# Patient Record
Sex: MALE | Race: WHITE | Employment: FULL TIME | URBAN - METROPOLITAN AREA
[De-identification: names, ages, dates, MRNs, and addresses within clinical notes are randomized per-mention and may not be internally consistent; named-entity substitution may affect disease eponyms.]

---

## 2023-11-06 ENCOUNTER — HOSPITAL ENCOUNTER (OUTPATIENT)
Age: 73
Setting detail: SPECIMEN
Discharge: HOME | End: 2023-11-06

## 2023-11-06 DIAGNOSIS — N28.9 RENAL INSUFFICIENCY: ICD-10-CM

## 2023-11-06 LAB
BUN SERPL-MCNC: 23 MG/DL (ref 8–23)
CREAT SERPL-MCNC: 1.2 MG/DL (ref 0.7–1.2)
GFR SERPL CREATININE-BSD FRML MDRD: >60 ML/MIN/1.73M2

## 2024-02-12 ENCOUNTER — HOSPITAL ENCOUNTER (OUTPATIENT)
Age: 74
Setting detail: SPECIMEN
Discharge: HOME | End: 2024-02-12

## 2024-02-12 DIAGNOSIS — R35.1 NOCTURIA: ICD-10-CM

## 2024-02-12 DIAGNOSIS — E29.1 MALE HYPOGONADISM: ICD-10-CM

## 2024-02-12 DIAGNOSIS — I10 ESSENTIAL HYPERTENSION: ICD-10-CM

## 2024-02-12 LAB
ALT SERPL-CCNC: 25 U/L (ref 5–41)
ANION GAP SERPL CALCULATED.3IONS-SCNC: 11 MMOL/L (ref 9–17)
AST SERPL-CCNC: 28 U/L
BUN SERPL-MCNC: 17 MG/DL (ref 8–23)
CALCIUM SERPL-MCNC: 9.1 MG/DL (ref 8.6–10.4)
CHLORIDE SERPL-SCNC: 96 MMOL/L (ref 98–107)
CO2 SERPL-SCNC: 27 MMOL/L (ref 20–31)
CREAT SERPL-MCNC: 1.1 MG/DL (ref 0.7–1.2)
GFR SERPL CREATININE-BSD FRML MDRD: >60 ML/MIN/1.73M2
GLUCOSE P FAST SERPL-MCNC: 94 MG/DL (ref 70–99)
POTASSIUM SERPL-SCNC: 3.5 MMOL/L (ref 3.7–5.3)
PSA SERPL-MCNC: 2 NG/ML (ref 0–4)
SHBG SERPL-SCNC: 47 NMOL/L (ref 11–80)
SODIUM SERPL-SCNC: 134 MMOL/L (ref 135–144)
TESTOST FREE MFR SERPL: 86 PG/ML (ref 47–244)
TESTOST SERPL-MCNC: 513 NG/DL (ref 220–1000)

## 2024-05-22 ENCOUNTER — HOSPITAL ENCOUNTER (OUTPATIENT)
Age: 74
Setting detail: SPECIMEN
Discharge: HOME | End: 2024-05-22

## 2024-05-22 DIAGNOSIS — R35.1 NOCTURIA: ICD-10-CM

## 2024-05-22 DIAGNOSIS — E29.1 HYPOGONADISM MALE: ICD-10-CM

## 2024-05-22 LAB
ALT SERPL-CCNC: 30 U/L (ref 10–50)
AST SERPL-CCNC: 50 U/L (ref 10–50)
BASOPHILS # BLD: <0.03 K/UL (ref 0–0.2)
BASOPHILS NFR BLD: 0 % (ref 0–2)
EOSINOPHIL # BLD: <0.03 K/UL (ref 0–0.44)
EOSINOPHILS RELATIVE PERCENT: 0 % (ref 1–4)
ERYTHROCYTE [DISTWIDTH] IN BLOOD BY AUTOMATED COUNT: 12.4 % (ref 11.8–14.4)
ESTRADIOL LEVEL: 61 PG/ML (ref 27–52)
HCT VFR BLD AUTO: 47.9 % (ref 40.7–50.3)
HGB BLD-MCNC: 16.5 G/DL (ref 13–17)
IMM GRANULOCYTES # BLD AUTO: 0.04 K/UL (ref 0–0.3)
IMM GRANULOCYTES NFR BLD: 1 %
LYMPHOCYTES NFR BLD: 0.82 K/UL (ref 1.1–3.7)
LYMPHOCYTES RELATIVE PERCENT: 14 % (ref 24–43)
MCH RBC QN AUTO: 32.1 PG (ref 25.2–33.5)
MCHC RBC AUTO-ENTMCNC: 34.4 G/DL (ref 28.4–34.8)
MCV RBC AUTO: 93.2 FL (ref 82.6–102.9)
MONOCYTES NFR BLD: 0.53 K/UL (ref 0.1–1.2)
MONOCYTES NFR BLD: 9 % (ref 3–12)
NEUTROPHILS NFR BLD: 76 % (ref 36–65)
NEUTS SEG NFR BLD: 4.32 K/UL (ref 1.5–8.1)
NRBC BLD-RTO: 0 PER 100 WBC
PLATELET # BLD AUTO: 200 K/UL (ref 138–453)
PMV BLD AUTO: 9.7 FL (ref 8.1–13.5)
PSA SERPL-MCNC: 2 NG/ML (ref 0–4)
RBC # BLD AUTO: 5.14 M/UL (ref 4.21–5.77)
SHBG SERPL-SCNC: 40 NMOL/L (ref 19–76)
TESTOST FREE MFR SERPL: 20.9 PG/ML (ref 47–244)
TESTOST SERPL-MCNC: 127 NG/DL (ref 193–740)
WBC OTHER # BLD: 5.7 K/UL (ref 3.5–11.3)

## 2024-09-13 ENCOUNTER — HOSPITAL ENCOUNTER (OUTPATIENT)
Age: 74
Setting detail: SPECIMEN
Discharge: HOME | End: 2024-09-13

## 2024-09-13 DIAGNOSIS — E29.1 HYPOGONADISM MALE: ICD-10-CM

## 2024-09-13 DIAGNOSIS — R35.1 NOCTURIA: ICD-10-CM

## 2024-09-13 LAB
ALT SERPL-CCNC: 22 U/L (ref 10–50)
AST SERPL-CCNC: 26 U/L (ref 10–50)
BASOPHILS # BLD: 0.03 K/UL (ref 0–0.2)
BASOPHILS NFR BLD: 0 % (ref 0–2)
EOSINOPHIL # BLD: 0.04 K/UL (ref 0–0.44)
EOSINOPHILS RELATIVE PERCENT: 1 % (ref 1–4)
ERYTHROCYTE [DISTWIDTH] IN BLOOD BY AUTOMATED COUNT: 12.5 % (ref 11.8–14.4)
ESTRADIOL LEVEL: 44 PG/ML (ref 27–52)
HCT VFR BLD AUTO: 45.5 % (ref 40.7–50.3)
HGB BLD-MCNC: 16.1 G/DL (ref 13–17)
IMM GRANULOCYTES # BLD AUTO: 0.07 K/UL (ref 0–0.3)
IMM GRANULOCYTES NFR BLD: 1 %
LYMPHOCYTES NFR BLD: 1.62 K/UL (ref 1.1–3.7)
LYMPHOCYTES RELATIVE PERCENT: 22 % (ref 24–43)
MCH RBC QN AUTO: 32.7 PG (ref 25.2–33.5)
MCHC RBC AUTO-ENTMCNC: 35.4 G/DL (ref 28.4–34.8)
MCV RBC AUTO: 92.5 FL (ref 82.6–102.9)
MONOCYTES NFR BLD: 0.66 K/UL (ref 0.1–1.2)
MONOCYTES NFR BLD: 9 % (ref 3–12)
NEUTROPHILS NFR BLD: 67 % (ref 36–65)
NEUTS SEG NFR BLD: 4.93 K/UL (ref 1.5–8.1)
NRBC BLD-RTO: 0 PER 100 WBC
PLATELET # BLD AUTO: 275 K/UL (ref 138–453)
PMV BLD AUTO: 8.9 FL (ref 8.1–13.5)
PSA SERPL-MCNC: 1.8 NG/ML (ref 0–4)
RBC # BLD AUTO: 4.92 M/UL (ref 4.21–5.77)
SHBG SERPL-SCNC: 31 NMOL/L (ref 19–76)
TESTOST FREE MFR SERPL: 203.3 PG/ML (ref 47–244)
TESTOST SERPL-MCNC: 846 NG/DL (ref 193–740)
WBC OTHER # BLD: 7.4 K/UL (ref 3.5–11.3)

## 2025-01-07 ENCOUNTER — HOSPITAL ENCOUNTER (OUTPATIENT)
Age: 75
Setting detail: SPECIMEN
Discharge: HOME | End: 2025-01-07

## 2025-01-07 DIAGNOSIS — R35.1 NOCTURIA: ICD-10-CM

## 2025-01-07 DIAGNOSIS — E29.1 HYPOGONADISM MALE: ICD-10-CM

## 2025-01-07 LAB
ALT SERPL-CCNC: 26 U/L (ref 10–50)
AST SERPL-CCNC: 26 U/L (ref 10–50)
BASOPHILS # BLD: 0.03 K/UL (ref 0–0.2)
BASOPHILS NFR BLD: 0 % (ref 0–2)
EOSINOPHIL # BLD: 0.1 K/UL (ref 0–0.44)
EOSINOPHILS RELATIVE PERCENT: 1 % (ref 1–4)
ERYTHROCYTE [DISTWIDTH] IN BLOOD BY AUTOMATED COUNT: 12.4 % (ref 11.8–14.4)
ESTRADIOL LEVEL: 21.2 PG/ML (ref 27–52)
HCT VFR BLD AUTO: 49.3 % (ref 40.7–50.3)
HGB BLD-MCNC: 16.7 G/DL (ref 13–17)
IMM GRANULOCYTES # BLD AUTO: 0.05 K/UL (ref 0–0.3)
IMM GRANULOCYTES NFR BLD: 1 %
LYMPHOCYTES NFR BLD: 1.7 K/UL (ref 1.1–3.7)
LYMPHOCYTES RELATIVE PERCENT: 22 % (ref 24–43)
MCH RBC QN AUTO: 32.4 PG (ref 25.2–33.5)
MCHC RBC AUTO-ENTMCNC: 33.9 G/DL (ref 28.4–34.8)
MCV RBC AUTO: 95.7 FL (ref 82.6–102.9)
MONOCYTES NFR BLD: 0.65 K/UL (ref 0.1–1.2)
MONOCYTES NFR BLD: 8 % (ref 3–12)
NEUTROPHILS NFR BLD: 68 % (ref 36–65)
NEUTS SEG NFR BLD: 5.3 K/UL (ref 1.5–8.1)
NRBC BLD-RTO: 0 PER 100 WBC
PLATELET # BLD AUTO: 212 K/UL (ref 138–453)
PMV BLD AUTO: 9.5 FL (ref 8.1–13.5)
PSA SERPL-MCNC: 2.78 NG/ML (ref 0–4)
RBC # BLD AUTO: 5.15 M/UL (ref 4.21–5.77)
SHBG SERPL-SCNC: 34 NMOL/L (ref 19–76)
TESTOST FREE MFR SERPL: 71.4 PG/ML (ref 47–244)
TESTOST SERPL-MCNC: 362 NG/DL (ref 193–740)
WBC OTHER # BLD: 7.8 K/UL (ref 3.5–11.3)

## 2025-05-20 ENCOUNTER — HOSPITAL ENCOUNTER (OUTPATIENT)
Age: 75
Setting detail: OBSERVATION
Discharge: HOME | End: 2025-05-21
Attending: EMERGENCY MEDICINE | Admitting: FAMILY MEDICINE
Payer: MEDICARE

## 2025-05-20 ENCOUNTER — APPOINTMENT (OUTPATIENT)
Dept: GENERAL RADIOLOGY | Age: 75
End: 2025-05-20
Payer: MEDICARE

## 2025-05-20 DIAGNOSIS — M10.9 ACUTE GOUT, UNSPECIFIED CAUSE, UNSPECIFIED SITE: ICD-10-CM

## 2025-05-20 DIAGNOSIS — L03.012 CELLULITIS OF FINGER OF LEFT HAND: Primary | ICD-10-CM

## 2025-05-20 LAB
ALBUMIN SERPL-MCNC: 4.1 G/DL (ref 3.5–5.2)
ALBUMIN/GLOB SERPL: 1.4 {RATIO} (ref 1–2.5)
ALP SERPL-CCNC: 67 U/L (ref 40–129)
ALT SERPL-CCNC: 23 U/L (ref 10–50)
ANION GAP SERPL CALCULATED.3IONS-SCNC: 17 MMOL/L (ref 9–16)
AST SERPL-CCNC: 25 U/L (ref 10–50)
BASOPHILS # BLD: 0.04 K/UL (ref 0–0.2)
BASOPHILS NFR BLD: 1 % (ref 0–2)
BILIRUB SERPL-MCNC: 0.4 MG/DL (ref 0–1.2)
BUN SERPL-MCNC: 22 MG/DL (ref 8–23)
CALCIUM SERPL-MCNC: 9.1 MG/DL (ref 8.8–10.2)
CHLORIDE SERPL-SCNC: 101 MMOL/L (ref 98–107)
CO2 SERPL-SCNC: 20 MMOL/L (ref 20–31)
CREAT SERPL-MCNC: 1.1 MG/DL (ref 0.7–1.2)
EOSINOPHIL # BLD: 0.22 K/UL (ref 0–0.44)
EOSINOPHILS RELATIVE PERCENT: 3 % (ref 1–4)
ERYTHROCYTE [DISTWIDTH] IN BLOOD BY AUTOMATED COUNT: 12.4 % (ref 11.8–14.4)
GFR, ESTIMATED: 74 ML/MIN/1.73M2
GLUCOSE SERPL-MCNC: 102 MG/DL (ref 82–115)
HCT VFR BLD AUTO: 44.8 % (ref 40.7–50.3)
HGB BLD-MCNC: 16.6 G/DL (ref 13–17)
IMM GRANULOCYTES # BLD AUTO: 0.04 K/UL (ref 0–0.3)
IMM GRANULOCYTES NFR BLD: 1 %
LYMPHOCYTES NFR BLD: 1.73 K/UL (ref 1.1–3.7)
LYMPHOCYTES RELATIVE PERCENT: 20 % (ref 24–43)
MCH RBC QN AUTO: 33.9 PG (ref 25.2–33.5)
MCHC RBC AUTO-ENTMCNC: 37.1 G/DL (ref 28.4–34.8)
MCV RBC AUTO: 91.6 FL (ref 82.6–102.9)
MONOCYTES NFR BLD: 0.76 K/UL (ref 0.1–1.2)
MONOCYTES NFR BLD: 9 % (ref 3–12)
NEUTROPHILS NFR BLD: 66 % (ref 36–65)
NEUTS SEG NFR BLD: 5.84 K/UL (ref 1.5–8.1)
NRBC BLD-RTO: 0 PER 100 WBC
PLATELET # BLD AUTO: 211 K/UL (ref 138–453)
PMV BLD AUTO: 8.8 FL (ref 8.1–13.5)
POTASSIUM SERPL-SCNC: 3.3 MMOL/L (ref 3.7–5.3)
PROT SERPL-MCNC: 7 G/DL (ref 6.6–8.7)
RBC # BLD AUTO: 4.89 M/UL (ref 4.21–5.77)
SODIUM SERPL-SCNC: 139 MMOL/L (ref 136–145)
WBC OTHER # BLD: 8.6 K/UL (ref 3.5–11.3)

## 2025-05-20 PROCEDURE — 80053 COMPREHEN METABOLIC PANEL: CPT

## 2025-05-20 PROCEDURE — 85652 RBC SED RATE AUTOMATED: CPT

## 2025-05-20 PROCEDURE — 99285 EMERGENCY DEPT VISIT HI MDM: CPT

## 2025-05-20 PROCEDURE — 96375 TX/PRO/DX INJ NEW DRUG ADDON: CPT

## 2025-05-20 PROCEDURE — 6370000000 HC RX 637 (ALT 250 FOR IP)

## 2025-05-20 PROCEDURE — 96374 THER/PROPH/DIAG INJ IV PUSH: CPT

## 2025-05-20 PROCEDURE — 85025 COMPLETE CBC W/AUTO DIFF WBC: CPT

## 2025-05-20 PROCEDURE — 73130 X-RAY EXAM OF HAND: CPT

## 2025-05-20 PROCEDURE — 2500000003 HC RX 250 WO HCPCS

## 2025-05-20 PROCEDURE — 6360000002 HC RX W HCPCS

## 2025-05-20 PROCEDURE — 84550 ASSAY OF BLOOD/URIC ACID: CPT

## 2025-05-20 PROCEDURE — 86140 C-REACTIVE PROTEIN: CPT

## 2025-05-20 PROCEDURE — 87040 BLOOD CULTURE FOR BACTERIA: CPT

## 2025-05-20 RX ORDER — KETOROLAC TROMETHAMINE 15 MG/ML
15 INJECTION, SOLUTION INTRAMUSCULAR; INTRAVENOUS ONCE
Status: COMPLETED | OUTPATIENT
Start: 2025-05-20 | End: 2025-05-20

## 2025-05-20 RX ADMIN — CEFAZOLIN 2000 MG: 10 INJECTION, POWDER, FOR SOLUTION INTRAVENOUS at 23:39

## 2025-05-20 RX ADMIN — POTASSIUM BICARBONATE 40 MEQ: 782 TABLET, EFFERVESCENT ORAL at 23:10

## 2025-05-20 RX ADMIN — KETOROLAC TROMETHAMINE 15 MG: 15 INJECTION, SOLUTION INTRAMUSCULAR; INTRAVENOUS at 22:36

## 2025-05-20 ASSESSMENT — ENCOUNTER SYMPTOMS
COUGH: 0
WHEEZING: 0
STRIDOR: 0
NAUSEA: 0
DIARRHEA: 0
SHORTNESS OF BREATH: 0
VOMITING: 0
CONSTIPATION: 0
ABDOMINAL PAIN: 0

## 2025-05-20 ASSESSMENT — PAIN DESCRIPTION - DESCRIPTORS: DESCRIPTORS: THROBBING

## 2025-05-20 ASSESSMENT — LIFESTYLE VARIABLES
HOW OFTEN DO YOU HAVE A DRINK CONTAINING ALCOHOL: MONTHLY OR LESS
HOW MANY STANDARD DRINKS CONTAINING ALCOHOL DO YOU HAVE ON A TYPICAL DAY: 1 OR 2

## 2025-05-20 ASSESSMENT — PAIN SCALES - GENERAL: PAINLEVEL_OUTOF10: 3

## 2025-05-20 ASSESSMENT — PAIN DESCRIPTION - LOCATION: LOCATION: HAND

## 2025-05-20 ASSESSMENT — PAIN DESCRIPTION - ORIENTATION: ORIENTATION: LEFT

## 2025-05-21 VITALS
BODY MASS INDEX: 27.89 KG/M2 | HEART RATE: 75 BPM | SYSTOLIC BLOOD PRESSURE: 162 MMHG | RESPIRATION RATE: 17 BRPM | DIASTOLIC BLOOD PRESSURE: 97 MMHG | OXYGEN SATURATION: 95 % | TEMPERATURE: 97.5 F | HEIGHT: 68 IN | WEIGHT: 184 LBS

## 2025-05-21 PROBLEM — Z78.9 DAILY CONSUMPTION OF ALCOHOL: Status: ACTIVE | Noted: 2025-05-21

## 2025-05-21 PROBLEM — E87.6 HYPOKALEMIA: Status: ACTIVE | Noted: 2025-05-21

## 2025-05-21 PROBLEM — M10.9 ACUTE GOUT: Status: ACTIVE | Noted: 2025-05-21

## 2025-05-21 PROBLEM — L03.114 CELLULITIS OF LEFT HAND: Status: ACTIVE | Noted: 2025-05-21

## 2025-05-21 PROBLEM — L03.012 CELLULITIS OF FINGER OF LEFT HAND: Status: ACTIVE | Noted: 2025-05-21

## 2025-05-21 LAB
CRP SERPL HS-MCNC: 13.2 MG/L (ref 0–5)
ERYTHROCYTE [SEDIMENTATION RATE] IN BLOOD BY PHOTOMETRIC METHOD: 19 MM/HR (ref 0–20)
URATE SERPL-MCNC: 7 MG/DL (ref 3.4–7)

## 2025-05-21 PROCEDURE — 6370000000 HC RX 637 (ALT 250 FOR IP)

## 2025-05-21 PROCEDURE — 99222 1ST HOSP IP/OBS MODERATE 55: CPT | Performed by: FAMILY MEDICINE

## 2025-05-21 PROCEDURE — 6360000002 HC RX W HCPCS

## 2025-05-21 PROCEDURE — G0378 HOSPITAL OBSERVATION PER HR: HCPCS

## 2025-05-21 PROCEDURE — APPSS45 APP SPLIT SHARED TIME 31-45 MINUTES

## 2025-05-21 PROCEDURE — 96366 THER/PROPH/DIAG IV INF ADDON: CPT

## 2025-05-21 PROCEDURE — 2580000003 HC RX 258

## 2025-05-21 PROCEDURE — 96365 THER/PROPH/DIAG IV INF INIT: CPT

## 2025-05-21 RX ORDER — FOLIC ACID 1 MG/1
1 TABLET ORAL DAILY
Status: DISCONTINUED | OUTPATIENT
Start: 2025-05-21 | End: 2025-05-21 | Stop reason: HOSPADM

## 2025-05-21 RX ORDER — GAUZE BANDAGE 2" X 2"
100 BANDAGE TOPICAL DAILY
Status: DISCONTINUED | OUTPATIENT
Start: 2025-05-21 | End: 2025-05-21 | Stop reason: HOSPADM

## 2025-05-21 RX ORDER — ONDANSETRON 4 MG/1
4 TABLET, ORALLY DISINTEGRATING ORAL EVERY 8 HOURS PRN
Status: DISCONTINUED | OUTPATIENT
Start: 2025-05-21 | End: 2025-05-21 | Stop reason: HOSPADM

## 2025-05-21 RX ORDER — ENOXAPARIN SODIUM 100 MG/ML
40 INJECTION SUBCUTANEOUS DAILY
Status: DISCONTINUED | OUTPATIENT
Start: 2025-05-21 | End: 2025-05-21 | Stop reason: HOSPADM

## 2025-05-21 RX ORDER — AMLODIPINE BESYLATE 5 MG/1
5 TABLET ORAL DAILY
Qty: 90 TABLET | Refills: 0 | Status: SHIPPED | OUTPATIENT
Start: 2025-05-21

## 2025-05-21 RX ORDER — POTASSIUM CHLORIDE 7.45 MG/ML
10 INJECTION INTRAVENOUS PRN
Status: DISCONTINUED | OUTPATIENT
Start: 2025-05-21 | End: 2025-05-21 | Stop reason: HOSPADM

## 2025-05-21 RX ORDER — POTASSIUM CHLORIDE 1500 MG/1
40 TABLET, EXTENDED RELEASE ORAL PRN
Status: DISCONTINUED | OUTPATIENT
Start: 2025-05-21 | End: 2025-05-21 | Stop reason: HOSPADM

## 2025-05-21 RX ORDER — ACETAMINOPHEN 650 MG/1
650 SUPPOSITORY RECTAL EVERY 6 HOURS PRN
Status: DISCONTINUED | OUTPATIENT
Start: 2025-05-21 | End: 2025-05-21 | Stop reason: HOSPADM

## 2025-05-21 RX ORDER — SODIUM CHLORIDE 0.9 % (FLUSH) 0.9 %
10 SYRINGE (ML) INJECTION PRN
Status: DISCONTINUED | OUTPATIENT
Start: 2025-05-21 | End: 2025-05-21 | Stop reason: HOSPADM

## 2025-05-21 RX ORDER — POLYETHYLENE GLYCOL 3350 17 G/17G
17 POWDER, FOR SOLUTION ORAL DAILY PRN
Status: DISCONTINUED | OUTPATIENT
Start: 2025-05-21 | End: 2025-05-21 | Stop reason: HOSPADM

## 2025-05-21 RX ORDER — ONDANSETRON 2 MG/ML
4 INJECTION INTRAMUSCULAR; INTRAVENOUS EVERY 6 HOURS PRN
Status: DISCONTINUED | OUTPATIENT
Start: 2025-05-21 | End: 2025-05-21 | Stop reason: HOSPADM

## 2025-05-21 RX ORDER — MAGNESIUM SULFATE 1 G/100ML
1000 INJECTION INTRAVENOUS PRN
Status: DISCONTINUED | OUTPATIENT
Start: 2025-05-21 | End: 2025-05-21 | Stop reason: HOSPADM

## 2025-05-21 RX ORDER — SODIUM CHLORIDE 0.9 % (FLUSH) 0.9 %
5-40 SYRINGE (ML) INJECTION EVERY 12 HOURS SCHEDULED
Status: DISCONTINUED | OUTPATIENT
Start: 2025-05-21 | End: 2025-05-21 | Stop reason: HOSPADM

## 2025-05-21 RX ORDER — SODIUM CHLORIDE 9 MG/ML
INJECTION, SOLUTION INTRAVENOUS PRN
Status: DISCONTINUED | OUTPATIENT
Start: 2025-05-21 | End: 2025-05-21 | Stop reason: HOSPADM

## 2025-05-21 RX ORDER — DOXYCYCLINE HYCLATE 100 MG
100 TABLET ORAL 2 TIMES DAILY
Qty: 14 TABLET | Refills: 0 | Status: SHIPPED | OUTPATIENT
Start: 2025-05-21 | End: 2025-05-28

## 2025-05-21 RX ORDER — ACETAMINOPHEN 325 MG/1
650 TABLET ORAL EVERY 6 HOURS PRN
Status: DISCONTINUED | OUTPATIENT
Start: 2025-05-21 | End: 2025-05-21 | Stop reason: HOSPADM

## 2025-05-21 RX ORDER — SODIUM CHLORIDE 9 MG/ML
INJECTION, SOLUTION INTRAVENOUS CONTINUOUS
Status: DISCONTINUED | OUTPATIENT
Start: 2025-05-21 | End: 2025-05-21 | Stop reason: HOSPADM

## 2025-05-21 RX ORDER — COLCHICINE 0.6 MG/1
0.6 TABLET ORAL DAILY
Qty: 90 TABLET | Refills: 0 | Status: SHIPPED | OUTPATIENT
Start: 2025-05-21

## 2025-05-21 RX ORDER — FOLIC ACID 1 MG/1
1 TABLET ORAL DAILY
Qty: 30 TABLET | Refills: 3 | Status: SHIPPED | OUTPATIENT
Start: 2025-05-22

## 2025-05-21 RX ORDER — THIAMINE MONONITRATE (VIT B1) 100 MG
100 TABLET ORAL DAILY
Qty: 30 TABLET | Refills: 0 | Status: SHIPPED | OUTPATIENT
Start: 2025-05-22

## 2025-05-21 RX ORDER — HYDROCHLOROTHIAZIDE 25 MG/1
25 TABLET ORAL DAILY
Status: DISCONTINUED | OUTPATIENT
Start: 2025-05-21 | End: 2025-05-21 | Stop reason: HOSPADM

## 2025-05-21 RX ADMIN — ACETAMINOPHEN 650 MG: 325 TABLET ORAL at 06:34

## 2025-05-21 RX ADMIN — SODIUM CHLORIDE: 0.9 INJECTION, SOLUTION INTRAVENOUS at 02:36

## 2025-05-21 RX ADMIN — HYDROCHLOROTHIAZIDE 25 MG: 25 TABLET ORAL at 09:54

## 2025-05-21 RX ADMIN — Medication 100 MG: at 09:54

## 2025-05-21 RX ADMIN — VANCOMYCIN HYDROCHLORIDE 2000 MG: 5 INJECTION, POWDER, LYOPHILIZED, FOR SOLUTION INTRAVENOUS at 02:43

## 2025-05-21 RX ADMIN — FOLIC ACID 1 MG: 1 TABLET ORAL at 09:54

## 2025-05-21 ASSESSMENT — ENCOUNTER SYMPTOMS
SORE THROAT: 0
NAUSEA: 0
EYE REDNESS: 0
CONSTIPATION: 0
ABDOMINAL PAIN: 0
SHORTNESS OF BREATH: 0
WHEEZING: 0

## 2025-05-21 NOTE — PROGRESS NOTES
Pt admitted to  2015 from ed per wheelchair.  Pt is alert and oriented.   Denies pain at this time.  Oriented to room   bed locked in low position, side rails up x2.  History completed with pt, all questions answered.    Call light within reach.

## 2025-05-21 NOTE — CONSULTS
[facility]   Pharmacy Pharmacokinetic Monitoring Service - Vancomycin     Johnie Escobar II is a 75 y.o. male starting on vancomycin therapy for cellulitis of left hand. Pharmacy consulted by Doris Gonzalez CNP for monitoring and adjustment.    Target Concentration: Goal AUC/ARIES 400-600 mg*hr/L    Additional Antimicrobials: ancef x 1    Pertinent Laboratory Values:   Wt Readings from Last 1 Encounters:   05/21/25 83.5 kg (184 lb)     Temp Readings from Last 1 Encounters:   05/21/25 (!) 94.4 °F (34.7 °C)     Estimated Creatinine Clearance: 61 mL/min (based on SCr of 1.1 mg/dL).  Recent Labs     05/20/25  2203   CREATININE 1.1   BUN 22   WBC 8.6     Procalcitonin: none    Pertinent Cultures:  Culture Date Source Results   5/20/25 blood No growth < 24h   MRSA Nasal Swab: N/A. Non-respiratory infection.    Plan:  Dosing recommendations based on Bayesian software  Vancomycin 2000mg x 1 given as a loading dose. Will continue vancomycin 1500mg q24h  Anticipated AUC of 510 and trough concentration of 12.9 at steady state  Renal labs as indicated   Vancomycin concentration ordered for 5/22/25 @ 1500   Pharmacy will continue to monitor patient and adjust therapy as indicated    Thank you for the consult,  Fred Christine RPH  5/21/2025 3:04 AM

## 2025-05-21 NOTE — DISCHARGE INSTR - ACTIVITY
Take all medications as prescribed  Follow with physicians as requested  Refrain from alcoholic beverages  Follow a purine restricted diet

## 2025-05-21 NOTE — H&P
[facility]  Office: [phone]  Alonzo Sequeira DO, Dexter Perez DO, Elieser Scott DO, Kaveh Eric DO, Krzysztof Oneill MD, Nichelle Damico MD, Ferny Sanchez MD, Vicky Guzman MD,  Malick Baldwin MD, Miguel Farnsworth MD, Trista Alford MD,  Delvin Florez DO, Tonya Chamberlain MD, Maximus Morillo MD, Judson Sequeira DO, Simi Salazar MD,  Gianfranco Tang DO, Neena Valetne MD, Clary Yanez MD, Gauri Zayas MD, Lucy Chang MD,  Carroll Sage MD, Dai Roman MD, Mary Jo Walton MD, Ac House MD, Jori Cottrell MD, Adalberto Nathan MD, Toribio Joshua DO, Juan Florence DO, Jaime Denis MD,  Abraham Lee MD, Shirley Waterhouse, CNP,  Yisel Burdick, CNP, Brandt Cruz, CNP,  Aminata Mosley, ADAM, Jennifer Mak, CNP, Anne Marie Pace, CNP, Tamara Dill CNP, Dione Espinosa, CNP, Ebonie Jallho, CNP, Michelle Madden, PA-C, Fransisca Starr PA-C, Marcie Sparks, CNP, Fauzia Redmond, CNP, Nabila Mullen, CNP, Ally Ghotra, CNS, Rosa Urias, CNP, Adrianne Mauro, CNP, Tracy Schwab, CNP         [facility]   IN-PATIENT SERVICE   [facility]    HISTORY AND PHYSICAL EXAMINATION            Date:   5/21/2025  Patient name:  Johnie Escobar II  Date of admission:  5/20/2025  9:12 PM  MRN:   [id number]  Account:  [id number]  YOB: 1950  PCP:    Jose Ayoub MD  Room:   [place] [address]  Code Status:    No Order    Chief Complaint:     Chief Complaint   Patient presents with    Hand Injury     Last Wednesday had a cataract surgery and they had trouble getting an IV in his left hand and then a couple days later he felt like he was having a gout flare up in the hand so started taking his medication with no help and then today the hand got swollen        History Obtained From:     patient, electronic medical record    History of Present Illness:     Johnie Escobar II is a 75 y.o. Unavailable / unknown male who presents with Hand Injury (Last Wednesday had a cataract surgery

## 2025-05-21 NOTE — DISCHARGE SUMMARY
[facility]  Office: [phone]  Alonzo Sequeira DO, Dexter Perez DO, Elieser Scott DO, Kaveh Eric DO, Krzysztof Oneill MD, Nichelle Damico MD, Ferny Sanchez MD, Vicky Guzman MD,  Malick Baldwin MD, Miguel Farnsworth MD, Trista Alford MD,  Delvin Florez DO, Tonya Chamberlain MD, Maximus Morillo MD, Judson Sequeira DO, Simi Salazar MD,  Gianfranco Tang DO, Clary Yanez MD, Gauri Zayas MD, Lucy Chang MD,  Carroll Sage MD, Dai Roman MD, Mary Jo Walton MD, Ac House MD, Jori Cottrell MD, Adalberto Nathan MD, Brandt Ely DO, Yanique Bowen MD, Abraham Lee MD, Delvin Concepcion MD, Mohsin Reza, MD, Alicia Naranjo MD, Shirley Waterhouse, CNP,  Yisel Burdick, CNP, Brandt Cruz, CNP,  Aminata Mosley, DNP, Jennifer Mak, CNP, Anne Marie Pace, CNP, Dione Espinosa, CNP, Ebonie Jalloh, CNP, Michelle Madden, PA-C, Marcie Sparks, CNP, Doris Gonzalez, CNP,  Elena Deleon, CNP, Delmy Mukherjee, CNP, Alan Ball, PA-C, Nabila Mullen, CNP,  Ally Ghotra, CNS, Tamara Dill, CNP, Adrianne Mauro, CNP,   Chely Monahan, CNP                [facility]      Discharge Summary     Patient ID: Johnie Escobar II  :  1950   MRN: [id number]     ACCOUNT:  [id number]   Patient Location :   Patient's PCP: Jose Ayoub MD  Admit Date: 2025   Discharge Date: 2025     Length of Stay: 0  Code Status:  Full Code  Admitting Physician: Ferny Sanchez MD  Discharge Physician: Ferny Sanchez MD     Active Discharge Diagnosis :     Primary Problem  Cellulitis of finger of left hand      Hospital Problems  Active Hospital Problems    Diagnosis Date Noted    Hypokalemia [E87.6] 2025    Cellulitis of finger of left hand [L03.012] 2025    Daily consumption of alcohol [Z78.9] 2025    Acute gout [M10.9] 2025    Essential hypertension [I10] 2014       Admission Condition:  fair     Discharged Condition: stable    Hospital Stay:

## 2025-05-21 NOTE — FLOWSHEET NOTE
05/21/25 1534   Discharge Event   Discharged with Documented Belongings Yes   Departure Mode With spouse   Mobility at Departure Ambulatory   Discharged to Private Residence   Time of Discharge 1534     Patient discharged in with all documented belongings in stable condition. Patient states understanding of all discharge instructions.

## 2025-05-21 NOTE — PROGRESS NOTES
Physical Therapy        Physical Therapy Cancel Note      DATE: 2025    NAME: Johnie Escobar II  MRN: [id number]   : 1950      Patient not seen this date for Physical Therapy due to:    Other: Pt and RN reporting independence and not having functional needs for therapy: Pt up in chair upon PT arrival and states that he can walk hallways without need for assistance.  Will DC PT order; can reassess as needed pending change in functional status.       Electronically signed by JOE ISAAC PT on 2025 at 1:22 PM

## 2025-05-21 NOTE — PLAN OF CARE
Pt is alert and oriented, independent in room.  Pt denies pain.  Right hand and right index finger edematous.   Receiving vanco IV.    Problem: Discharge Planning  Goal: Discharge to home or other facility with appropriate resources  Outcome: Progressing

## 2025-05-21 NOTE — PROGRESS NOTES
Occupational Therapy  [facility]  Occupational Therapy Not Seen Note    Patient not available for Occupational Therapy due to:    [] Testing:    [] Hemodialysis    [] Cancelled by RN:    []Refusal by Patient:    [] Surgery:     [] Intubation:     [] Pain Medication:    [] Sedation:     [] Spine Precautions :    [] Medical Instability:    [x] Other: Pt and RN reporting I and no therapy needs; pt stated L hand swelling down, he has pain but has greatly improved and is functionally using with no issues.  Pt requested no OT eval and DC OT order.

## 2025-05-21 NOTE — PROGRESS NOTES
[x] Medication Reconciliation was completed and the patient's home medication list was verified. The Med List Status has been marked \"Complete\". The following sources were used to assist with Medication Reconciliation:    [] Patient had a list of medications which was transcribed into the EHR.    [] Patient provided bottles of their medications    [x] Home medications reviewed and confirmed with     [] Contacted patient's pharmacy to confirm home medications    [] Contacted patient's physician office to confirm home medications    [] Medical Records from another facility and/or Care Everywhere were reviewed    OR    [] There are one or more home medications that need clarification before Medication Reconciliation can be completed. The Med List Status has been marked as In Progress. To assist with Home Medication Reconciliation the following actions have been taken:    [] Pharmacy medication reconciliation service requested. (Note: This can be done by sending a Perfect Serve message to [facility] Pharmacist or by phoning [phone].)    [] Family requested to bring medications into the hospital    [] Family requested to call hospital with medication list    [] Message left with physician office    [] Request for medical records made to     [] Other

## 2025-05-21 NOTE — ED PROVIDER NOTES
[facility] EMERGENCY DEPARTMENT  eMERGENCY dEPARTMENT eNCOUnter      Pt Name: Johnie Escobar II  MRN: [id number]  Birthdate 1950  Date of evaluation: 5/20/2025  Provider: Kirstin Castle PA-C    CHIEF COMPLAINT       Chief Complaint   Patient presents with    Hand Injury     Last Wednesday had a cataract surgery and they had trouble getting an IV in his left hand and then a couple days later he felt like he was having a gout flare up in the hand so started taking his medication with no help and then today the hand got swollen          HISTORY OF PRESENT ILLNESS  (Location/Symptom, Timing/Onset, Context/Setting, Quality, Duration, Modifying Factors, Severity.)   Johnie Escobar II is a 75 y.o. male who presents to the emergency department with left index finger pain and swelling.  Patient states he had cataract surgery 1 week ago and they difficulty placing his IV in the left hand.  He noticed 1 to 2 days after that he had pain and swelling of his left index finger and hand.  The pain has since increased and is made worse by movement or use of the finger.  He also notes significant swelling and redness of the finger.  He denies fever, chills, palpitations, shortness of breath, chest pain, nausea, vomiting, diarrhea.    Nursing Notes were reviewed.    ALLERGIES     Patient has no known allergies.    CURRENT MEDICATIONS       Previous Medications    ANASTROZOLE (ARIMIDEX) 1 MG TABLET    Take 1 tablet by mouth every 14 days    CEPHALEXIN (KEFLEX) 500 MG CAPSULE    Take 1 capsule by mouth 4 times daily    COLCHICINE (COLCRYS) 0.6 MG TABLET    TAKE ONE TABLET BY MOUTH ONE TIME DAILY    HYDROCHLOROTHIAZIDE (HYDRODIURIL) 25 MG TABLET    TAKE ONE TABLET BY MOUTH ONE TIME DAILY    POTASSIUM CHLORIDE (KLOR-CON M) 10 MEQ EXTENDED RELEASE TABLET    Take 1 tablet by mouth daily       PAST MEDICAL HISTORY         Diagnosis Date    Anxiety     Gout     Hypertension        SURGICAL HISTORY           Procedure

## 2025-05-21 NOTE — CARE COORDINATION
Case Management Assessment  Initial Evaluation    Date/Time of Evaluation: 5/21/2025 8:15 AM  Assessment Completed by: Veronica Russell    If patient is discharged prior to next notation, then this note serves as note for discharge by case management.    Patient Name: Johnie Escobar II                   YOB: 1950  Diagnosis: Cellulitis of left hand [L03.114]  Cellulitis of finger of left hand [L03.012]                   Date / Time: 5/20/2025  9:12 PM    Patient Admission Status: Observation   Readmission Risk (Low < 19, Mod (19-27), High > 27): No data recorded  Current PCP: Jose Ayoub MD  PCP verified by CM? (P) Yes    Chart Reviewed: Yes      History Provided by: (P) Patient  Patient Orientation: (P) Alert and Oriented    Patient Cognition: (P) Alert    Hospitalization in the last 30 days (Readmission):  No    If yes, Readmission Assessment in  Navigator will be completed.    Advance Directives:      Code Status: Full Code   Patient's Primary Decision Maker is: (P) Legal Next of Kin      Discharge Planning:    Patient lives with: (P) Spouse/Significant Other Type of Home: (P) House  Primary Care Giver: (P) Self  Patient Support Systems include: (P) Spouse/Significant Other, Children   Current Financial resources: (P) Medicare  Current community resources: (P) None  Current services prior to admission: (P) None            Current DME:              Type of Home Care services:  (P) None    ADLS  Prior functional level: (P) Independent in ADLs/IADLs  Current functional level: (P) Independent in ADLs/IADLs    PT AM-PAC:   /24  OT AM-PAC:   /24    Family can provide assistance at DC: (P) Yes  Would you like Case Management to discuss the discharge plan with any other family members/significant others, and if so, who? (P) No  Plans to Return to Present Housing: (P) Yes  Other Identified Issues/Barriers to RETURNING to current housing: medical complications  Potential Assistance needed at discharge: (P)

## 2025-05-21 NOTE — PLAN OF CARE
Problem: Discharge Planning  Goal: Discharge to home or other facility with appropriate resources  5/21/2025 1459 by Charissa Contreras, RN  Outcome: Adequate for Discharge  Flowsheets (Taken 5/21/2025 0900)  Discharge to home or other facility with appropriate resources:   Identify barriers to discharge with patient and caregiver   Arrange for needed discharge resources and transportation as appropriate   Identify discharge learning needs (meds, wound care, etc)   Arrange for interpreters to assist at discharge as needed   Refer to discharge planning if patient needs post-hospital services based on physician order or complex needs related to functional status, cognitive ability or social support system     Problem: Skin/Tissue Integrity - Adult  Goal: Skin integrity remains intact  Outcome: Adequate for Discharge     Problem: Musculoskeletal - Adult  Goal: Maintain proper alignment of affected body part  Outcome: Adequate for Discharge

## 2025-05-22 ENCOUNTER — CARE COORDINATION (OUTPATIENT)
Dept: CARE COORDINATION | Age: 75
End: 2025-05-22

## 2025-05-22 NOTE — CARE COORDINATION
Care Transitions Note    Initial Call - Call within 2 business days of discharge: Yes    Attempted to reach patient for transitions of care follow up. Unable to reach patient.    Outreach Attempts:   HIPAA compliant voicemail left for patient.     Patient: Johnie Escobar II    Patient : 1950   MRN: [id number]    Reason for Admission: cellulitis  Discharge Date: 25  RURS: No data recorded  Last Discharge Facility       Date Complaint Diagnosis Description Type Department Provider    25 Hand Injury Cellulitis of finger of left hand ... ED to Hosp-Admission (Discharged) (ADMITTED) eFrny Hatfield MD; Giovani Walsh MD            Was this an external facility discharge? No    Follow Up Appointment:   Patient has hospital follow up appointment scheduled within 7 days of discharge.    Future Appointments         Provider Specialty Dept Phone    2025 8:30 AM Jose Ayoub MD Family Medicine [phone]    2025 8:00 AM Jose Ayoub MD Family Medicine [phone]    2025 8:00 AM Jose Ayoub MD Family Medicine [phone]            Plan for follow-up on next business day.      Mary Mora RN

## 2025-05-23 ENCOUNTER — CARE COORDINATION (OUTPATIENT)
Dept: CARE COORDINATION | Age: 75
End: 2025-05-23

## 2025-05-23 DIAGNOSIS — L03.012 CELLULITIS OF FINGER OF LEFT HAND: Primary | ICD-10-CM

## 2025-05-23 PROCEDURE — 1111F DSCHRG MED/CURRENT MED MERGE: CPT | Performed by: STUDENT IN AN ORGANIZED HEALTH CARE EDUCATION/TRAINING PROGRAM

## 2025-05-23 NOTE — CARE COORDINATION
Care Transitions Note    Initial Call - Call within 2 business days of discharge: Yes    Patient Current Location:  Home: [address] [place], [place] [address]    Care Transition Nurse contacted the patient by telephone to perform post hospital discharge assessment, verified name and  as identifiers.  Provided introduction to self, and explanation of the Care Transition Nurse role.    Patient: Johnie Escobar II    Patient : 1950   MRN: [id number]    Reason for Admission: celluliits  Discharge Date: 25  RURS: No data recorded    Last Discharge Facility       Date Complaint Diagnosis Description Type Department Provider    25 Hand Injury Cellulitis of finger of left hand ... ED to Hosp-Admission (Discharged) (ADMITTED) Ferny Hatfield MD; Giovani Walsh MD            Was this an external facility discharge? No    Additional needs identified to be addressed with provider   No needs identified             Method of communication with provider: none.    Patients top risk factors for readmission: medical condition-gout, cellulitis, HTN    Interventions to address risk factors:   Education: when to call MD  Review of patient management of conditions/medications: symptoms , HFU appt, medications    Care Summary Note: Patient reached for LIZETT, he is home to self care after observation admit for cellulitis vs gout flare up. States doing well and feeling better. Confirms AVS, new norvasc, doxycycline, folic acid, thiamin. He has stopped medications as listed on AVS. PCP HFU scheduled for . Med rec completed, continues on colchicine 0.6 mg daily. He is aware to avoid high purine foods, declines RD consult. Not checking BP at home, he will defer to PCP for monitoring. He is aware to monitor for worsening symptoms and notify MD. Denies questions, concerns or further needs and agrees to follow up in 11-14 days.     Care Transition Nurse reviewed discharge instructions with patient. The patient was given

## 2025-05-25 LAB
MICROORGANISM SPEC CULT: NORMAL
SERVICE CMNT-IMP: NORMAL
SPECIMEN DESCRIPTION: NORMAL

## 2025-06-05 ENCOUNTER — CARE COORDINATION (OUTPATIENT)
Dept: CARE COORDINATION | Age: 75
End: 2025-06-05

## 2025-06-05 NOTE — CARE COORDINATION
Care Transitions Note    Follow Up Call     Attempted to reach patient for transitions of care follow up.  Unable to reach patient.      Outreach Attempts:   HIPAA compliant voicemail left for patient.     Care Summary Note: first attempt follow up, left VM    Follow Up Appointment:   Future Appointments         Provider Specialty Dept Phone    6/11/2025 8:30 AM Jose Ayoub MD Family Medicine [phone]    8/14/2025 8:00 AM Jose Ayoub MD Family Medicine [phone]    9/9/2025 8:00 AM Jose Ayoub MD Family Medicine [phone]            Plan for follow-up call in 2-5 days based on severity of symptoms and risk factors. Plan for next call: symptom management-any further gout symptoms? Swelling, fever, pain? Changes since PCP HFU?     Mary Mora RN

## 2025-06-06 ENCOUNTER — CARE COORDINATION (OUTPATIENT)
Dept: CARE COORDINATION | Age: 75
End: 2025-06-06

## 2025-06-06 NOTE — CARE COORDINATION
Care Transitions Note    Follow Up Call     Patient Current Location:  Home: [facility] [place] [address]    Care Transition Nurse contacted the patient by telephone. Verified name and  as identifiers.    Additional needs identified to be addressed with provider   No needs identified                 Method of communication with provider: none.    Care Summary Note: Patient reached for follow up LIZETT for cellulitis vs gout observation admit. States has completed antibiotics and affected hand is back to normal. States PCP aware of admission and medication changes. Denies concerning symptoms, barriers to needs or need for further follow up. Reviewed CTN contact for any further needs.     Plan of care updates since last contact:  Review of patient management of conditions/medications: symptoms       Advance Care Planning:   Does patient have an Advance Directive: not on file    Medication Review:  Full medication review completed during previous call.    Remote Patient Monitoring:  Offered patient enrollment in the Remote Patient Monitoring (RPM) program for in-home monitoring: Patient is not eligible for RPM program because: affiliate provider.    Assessments:  Care Transitions Subsequent and Final Call    Subsequent and Final Calls  Do you have any ongoing symptoms?: No  Have your medications changed?: No  Do you have any questions related to your medications?: No  Do you currently have any active services?: No  Do you have any needs or concerns that I can assist you with?: No  Identified Barriers: None  Care Transitions Interventions  Other Interventions:              Follow Up Appointment:   Completed nurse visit, upcoming appt   Future Appointments         Provider Specialty Dept Phone    2025 8:30 AM Jose Ayoub MD Family Medicine [phone]    2025 8:00 AM Jose Ayoub MD Family Medicine [phone]    2025 8:00 AM Jose Ayoub MD Family Medicine [phone]

## 2025-06-19 ENCOUNTER — HOSPITAL ENCOUNTER (OUTPATIENT)
Facility: CLINIC | Age: 75
Discharge: HOME | End: 2025-06-19
Payer: MEDICARE

## 2025-06-19 DIAGNOSIS — R35.1 NOCTURIA: ICD-10-CM

## 2025-06-19 DIAGNOSIS — E29.1 HYPOGONADISM MALE: ICD-10-CM

## 2025-06-19 LAB
ALT SERPL-CCNC: 29 U/L (ref 10–50)
AST SERPL-CCNC: 24 U/L (ref 10–50)
BASOPHILS # BLD: 0.03 K/UL (ref 0–0.2)
BASOPHILS NFR BLD: 1 % (ref 0–2)
EOSINOPHIL # BLD: 0.04 K/UL (ref 0–0.44)
EOSINOPHILS RELATIVE PERCENT: 1 % (ref 1–4)
ERYTHROCYTE [DISTWIDTH] IN BLOOD BY AUTOMATED COUNT: 12.4 % (ref 11.8–14.4)
ESTRADIOL LEVEL: 32.5 PG/ML (ref 27–52)
HCT VFR BLD AUTO: 45.4 % (ref 40.7–50.3)
HGB BLD-MCNC: 16 G/DL (ref 13–17)
IMM GRANULOCYTES # BLD AUTO: 0.03 K/UL (ref 0–0.3)
IMM GRANULOCYTES NFR BLD: 1 %
LYMPHOCYTES NFR BLD: 1.02 K/UL (ref 1.1–3.7)
LYMPHOCYTES RELATIVE PERCENT: 17 % (ref 24–43)
MCH RBC QN AUTO: 32.5 PG (ref 25.2–33.5)
MCHC RBC AUTO-ENTMCNC: 35.2 G/DL (ref 28.4–34.8)
MCV RBC AUTO: 92.1 FL (ref 82.6–102.9)
MONOCYTES NFR BLD: 0.46 K/UL (ref 0.1–1.2)
MONOCYTES NFR BLD: 8 % (ref 3–12)
NEUTROPHILS NFR BLD: 72 % (ref 36–65)
NEUTS SEG NFR BLD: 4.59 K/UL (ref 1.5–8.1)
NRBC BLD-RTO: 0 PER 100 WBC
PLATELET # BLD AUTO: 211 K/UL (ref 138–453)
PMV BLD AUTO: 9.3 FL (ref 8.1–13.5)
PSA SERPL-MCNC: 2.46 NG/ML (ref 0–4)
RBC # BLD AUTO: 4.93 M/UL (ref 4.21–5.77)
SHBG SERPL-SCNC: 34 NMOL/L (ref 19–76)
TESTOST FREE MFR SERPL: 236.5 PG/ML (ref 47–244)
TESTOST SERPL-MCNC: 996 NG/DL (ref 193–740)
WBC OTHER # BLD: 6.2 K/UL (ref 3.5–11.3)

## 2025-06-19 PROCEDURE — 85025 COMPLETE CBC W/AUTO DIFF WBC: CPT

## 2025-06-19 PROCEDURE — 84270 ASSAY OF SEX HORMONE GLOBUL: CPT

## 2025-06-19 PROCEDURE — 84403 ASSAY OF TOTAL TESTOSTERONE: CPT

## 2025-06-19 PROCEDURE — 84153 ASSAY OF PSA TOTAL: CPT

## 2025-06-19 PROCEDURE — 84460 ALANINE AMINO (ALT) (SGPT): CPT

## 2025-06-19 PROCEDURE — 82670 ASSAY OF TOTAL ESTRADIOL: CPT

## 2025-06-19 PROCEDURE — 84450 TRANSFERASE (AST) (SGOT): CPT

## 2025-06-19 PROCEDURE — 36415 COLL VENOUS BLD VENIPUNCTURE: CPT
